# Patient Record
Sex: FEMALE | URBAN - METROPOLITAN AREA
[De-identification: names, ages, dates, MRNs, and addresses within clinical notes are randomized per-mention and may not be internally consistent; named-entity substitution may affect disease eponyms.]

---

## 2019-11-07 ENCOUNTER — TRANSCRIBE ORDERS (OUTPATIENT)
Dept: ADMINISTRATIVE | Facility: HOSPITAL | Age: 44
End: 2019-11-07

## 2019-11-07 DIAGNOSIS — Z12.31 OTHER SCREENING MAMMOGRAM: Primary | ICD-10-CM

## 2023-11-19 ENCOUNTER — HOSPITAL ENCOUNTER (EMERGENCY)
Facility: HOSPITAL | Age: 48
Discharge: HOME | End: 2023-11-19
Attending: EMERGENCY MEDICINE

## 2023-11-19 VITALS
WEIGHT: 227.51 LBS | TEMPERATURE: 98.9 F | OXYGEN SATURATION: 100 % | DIASTOLIC BLOOD PRESSURE: 94 MMHG | SYSTOLIC BLOOD PRESSURE: 137 MMHG | RESPIRATION RATE: 18 BRPM | BODY MASS INDEX: 38.84 KG/M2 | HEIGHT: 64 IN | HEART RATE: 99 BPM

## 2023-11-19 DIAGNOSIS — M79.605 LEFT LEG PAIN: ICD-10-CM

## 2023-11-19 DIAGNOSIS — H00.015 HORDEOLUM EXTERNUM OF LEFT LOWER EYELID: Primary | ICD-10-CM

## 2023-11-19 PROCEDURE — 99284 EMERGENCY DEPT VISIT MOD MDM: CPT | Performed by: EMERGENCY MEDICINE

## 2023-11-19 PROCEDURE — 99282 EMERGENCY DEPT VISIT SF MDM: CPT

## 2023-11-19 RX ORDER — LIDOCAINE 50 MG/G
1 PATCH TOPICAL ONCE
Status: DISCONTINUED | OUTPATIENT
Start: 2023-11-19 | End: 2023-11-19 | Stop reason: HOSPADM

## 2023-11-19 RX ORDER — ACETAMINOPHEN 325 MG/1
975 TABLET ORAL ONCE
Status: COMPLETED | OUTPATIENT
Start: 2023-11-19 | End: 2023-11-19

## 2023-11-19 RX ORDER — LIDOCAINE 50 MG/G
1 PATCH TOPICAL EVERY 24 HOURS
Qty: 15 PATCH | Refills: 0 | Status: SHIPPED | OUTPATIENT
Start: 2023-11-19

## 2023-11-19 RX ORDER — FLUOXETINE 10 MG/1
10 CAPSULE ORAL DAILY
COMMUNITY

## 2023-11-19 RX ORDER — HYDROXYZINE HYDROCHLORIDE 10 MG/1
10 TABLET, FILM COATED ORAL EVERY 6 HOURS PRN
COMMUNITY

## 2023-11-19 RX ORDER — IBUPROFEN 600 MG/1
600 TABLET ORAL ONCE
Status: COMPLETED | OUTPATIENT
Start: 2023-11-19 | End: 2023-11-19

## 2023-11-19 RX ADMIN — LIDOCAINE 1 PATCH: 700 PATCH TOPICAL at 17:45

## 2023-11-19 RX ADMIN — ACETAMINOPHEN 975 MG: 325 TABLET, FILM COATED ORAL at 17:43

## 2023-11-19 RX ADMIN — IBUPROFEN 600 MG: 600 TABLET, FILM COATED ORAL at 17:43

## 2023-11-19 NOTE — Clinical Note
Arturo Pavonjoseph was seen and treated in our emergency department on 11/19/2023. Diagnosis:     Merle Daughters  may return to work on return date. She may return on this date: 11/21/2023         If you have any questions or concerns, please don't hesitate to call.       Harvinder Rojas, DO    ______________________________           _______________          _______________  Hospital Representative                              Date                                Time

## 2023-11-19 NOTE — ED NOTES
Patient came into hallway expressing frustration that a provider has not yet been in to see her. Explained to patient that unit acuity is high and provider would be in to see her as soon as able. Patient went back into room.      Cristal De RN  11/19/23 9211

## 2023-11-19 NOTE — DISCHARGE INSTRUCTIONS
Use warm compresses, as directed. This will help decrease swelling and pain. Wet a clean washcloth with warm water and place it on your eye for 10 to 15 minutes, 3 to 4 times each day, or as directed. Keep your hands away from your eye. This helps to prevent the spread of infection to other parts of the eye. Wash your hands often with soap and dry with a clean towel. Do not squeeze the stye. Do not wear eye makeup while you have a stye. Eye makeup may carry bacteria and cause another stye. Throw away eye makeup and brushes used to apply the makeup. Use new eye makeup after the stye has gone away. Do not share eye makeup with others. Please follow-up with ophthalmology, Dr. Redd Board number listed below/on next page. A referral was made for [facility], please contact them if they do not contact you. Numbers listed below/on next page.

## 2023-11-19 NOTE — ED NOTES
Pt states "I'm going to leave, no dr has been in here" pt yelling and argumentative @ charge RN.   pt ambulatory out of the [address] RN  11/19/23 520 [facility], RN  11/19/23 6387
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
## 2023-11-19 NOTE — Clinical Note
Kathyrn Eisenmenger was seen and treated in our emergency department on 11/19/2023. Diagnosis:     Leelee Mcgowan  may return to work on return date. She may return on this date: 11/21/2023         If you have any questions or concerns, please don't hesitate to call.       Katy Herrmann, DO    ______________________________           _______________          _______________  Hospital Representative                              Date                                Time

## 2023-11-20 ENCOUNTER — TELEPHONE (OUTPATIENT)
Dept: PHYSICAL THERAPY | Facility: OTHER | Age: 48
End: 2023-11-20

## 2023-11-20 NOTE — ED ATTENDING ATTESTATION
11/19/2023  I, Lanny Buenrostro MD, saw and evaluated the patient. I have discussed the patient with the resident/non-physician practitioner and agree with the resident's/non-physician practitioner's findings, Plan of Care, and MDM as documented in the resident's/non-physician practitioner's note, except where noted. All available labs and Radiology studies were reviewed. I was present for key portions of any procedure(s) performed by the resident/non-physician practitioner and I was immediately available to provide assistance. At this point I agree with the current assessment done in the Emergency Department. I have conducted an independent evaluation of this patient a history and physical is as follows: Swelling consistent with left eye stye. No evidence of orbital cellulitis. No evidence of conjunctivitis on examination. No visual deficits. Chronic pain of the left hip and upper leg. No evidence of DVT on examination. Warm compresses, symptomatic management, referral to [facility]. No labs or imaging clinically indicated.     ED Course         Critical Care Time  Procedures

## 2023-11-29 NOTE — TELEPHONE ENCOUNTER
Call placed to the patient per [facility] referral.     V/M left for patient to call back. Phone number and hours of business provided. This is the 2nd attempt to reach the patient. Will defer per protocol.